# Patient Record
Sex: FEMALE | Race: WHITE
[De-identification: names, ages, dates, MRNs, and addresses within clinical notes are randomized per-mention and may not be internally consistent; named-entity substitution may affect disease eponyms.]

---

## 2017-03-14 ENCOUNTER — HOSPITAL ENCOUNTER (OUTPATIENT)
Dept: HOSPITAL 80 - FIMAGING | Age: 52
End: 2017-03-14
Attending: GENERAL ACUTE CARE HOSPITAL
Payer: COMMERCIAL

## 2017-03-14 DIAGNOSIS — Z12.31: Primary | ICD-10-CM

## 2017-03-14 PROCEDURE — G0202 SCR MAMMO BI INCL CAD: HCPCS

## 2018-02-08 ENCOUNTER — HOSPITAL ENCOUNTER (EMERGENCY)
Dept: HOSPITAL 80 - FED | Age: 53
Discharge: HOME | End: 2018-02-08
Payer: COMMERCIAL

## 2018-02-08 VITALS — RESPIRATION RATE: 16 BRPM | TEMPERATURE: 97.9 F

## 2018-02-08 VITALS — DIASTOLIC BLOOD PRESSURE: 73 MMHG | SYSTOLIC BLOOD PRESSURE: 115 MMHG | OXYGEN SATURATION: 96 % | HEART RATE: 61 BPM
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
## 2018-02-08 DIAGNOSIS — S22.32XA: Primary | ICD-10-CM

## 2018-02-08 NOTE — EDPHY
H & P


Stated Complaint: Fell while running this morning, injury to left ribs.


Time Seen by Provider: 02/08/18 08:44


HPI/ROS: 





CHIEF COMPLAINT: Rib pain





HISTORY OF PRESENT ILLNESS: The patient is a 53 y/o female arriving with her 

daughter complaining of left lateral chest pain secondary to a fall while 

running this morning. She tripped while running and landed on her left chest 

and left knee. She has localized pain over her left lower lateral ribs. She 

describes popping sensation when breathing and says her pain is worse with 

movement. The knee pain is minimal and has not caused difficulty walking. She 

denies striking her head, loss of consciousness, weakness, paresthesias, dyspnea

, or other complaints. She is normally healthy.





REVIEW OF SYSTEMS:


Constitutional:  No weakness


Eyes:  No visual changes or eye pain


ENT:  No dental trauma


Neck:No pain or injury


Respiratory:  No shortness of breath


Gastrointestinal:  No abdominal pain, no vomiting


Back:No pain or injury


Genitourinary:  No hematuria


Skin:  No lacerations


Neurological:  No headache, no dizziness











- Personal History


Current Tetanus Diphtheria and Acellular Pertussis (TDAP): Yes





- Medical/Surgical History


PMH: 





Denies


Hx Asthma: No


Hx Chronic Respiratory Disease: No


Hx Diabetes: No


Hx Cardiac Disease: No


Hx Renal Disease: No


Hx Cirrhosis: No


Hx Alcoholism: No


Hx HIV/AIDS: No


Hx Splenectomy or Spleen Trauma: No


Other PMH: Denies.





- Social History


Smoking Status: Never smoked


Additional Social History: 





Nonsmoker. Daughter at bedside. Lives in [place].





- Physical Exam


Exam: 





General Appearance:  Alert, no distress


Head:  Atraumatic


Eyes:  No conjunctival erythema, PERRLA, EOMI


ENT, Mouth:  no oral trauma, no bony tenderness


Neck:  Nontender, full range of motion without pain


Respiratory:  Tender over the left lateral chest wall, with a popping sensation 

with inspiration, lungs are clear to auscultation


Cardiovascular:  Regular rate and rhythm


Abdomen:  Abdomen is soft and nontender


Skin:  No laceration


Back:  No midline T/L/S tenderness


Extremities:  Left knee-abrasion anteriorly, range of motion without pain, no 

joint effusion


Neurological:  A&Ox3, normal motor function, normal sensory exam, cranial 

nerves intact


Psychiatric:  Mood and affect normal





Constitutional: 


 Initial Vital Signs











Temperature (C)  36.6 C   02/08/18 08:07


 


Heart Rate  80   02/08/18 08:07


 


Respiratory Rate  16   02/08/18 08:07


 


Blood Pressure  99/72 L  02/08/18 08:07


 


O2 Sat (%)  97   02/08/18 08:07








 











O2 Delivery Mode               Room Air














Allergies/Adverse Reactions: 


 





No Known Allergies Allergy (Unverified 10/11/11 13:21)


 








Home Medications: 














 Medication  Instructions  Recorded


 


Zoloft 100mg (*)  02/08/18














Medical Decision Making





- Diagnostics


Imaging Results: 


Chest x-ray discussed with the radiologist reveals a pulmonary nodule, and no 

pneumothorax.


Imaging: Discussed imaging studies w/ On call Radiologist, I viewed and 

interpreted images myself


ED Course/Re-evaluation: 


This is a healthy 53 y/o female who presents with point tenderness and mild 

crepitus over her left lateral chest wall secondary to a trip and fall while 

running this morning. Normal respiratory exam. Exam is consistent with rib 

fracture. Plan to treat with standard fracture care including ice, NSAIDs, 

lidocaine patches, incentive spirometer. Patient declined narcotic pain 

medication. Recommended follow up with PCP as needed.





Chest x-ray shows no obvious fracture or pneumothorax. She does have a left 

upper lung nodule that will require a repeat chest x-ray in 3 months. This was 

communicated to the patient. 


Differential Diagnosis: 





Differential diagnosis includes though it is not limited to fracture, 

intracranial hemorrhage, pneumothorax, hemothorax, intra-abdominal hemorrhage.





- Data Points


Medications Given: 


 








Discontinued Medications





Miscellaneous Medication (Icy Hot Lidocaine/Menthol 4%/1% Patch)  1 patch TD 

EDNOW ONE


   Stop: 02/08/18 09:18


   Last Admin: 02/08/18 09:18 Dose:  1 patch








Departure





- Departure


Disposition: Home, Routine, Self-Care


Clinical Impression: 


 Left rib fracture





Condition: Good


Instructions:  Lidocaine Patch (On the skin), How to Use an Incentive 

Spirometer (ED), Rib Fracture (ED)


Additional Instructions: 


1. Apply ice to sore areas. Expect to feel more sore over the next 1-2 days. I 

recommend decreasing activity during this time to allow your body a chance to 

heal.


2. Use Tylenol and ibuprofen as needed for pain over the next several days. 


3. Apply lidocaine patches as directed on the packaging as needed for pain. 

These are available over-the-counter.


4. Use incentive spirometer as directed to promote full inspirations.


5. Follow up with your primary care provider for unimproved symptoms over the 1-

2 weeks.


6. Return to the ED for severe pain, difficulty breathing, or other worsening 

of condition.


7. Get a repeat chest x-ray in 3 months through your primary care provider to 

follow up on left lung nodule.





Adult Pain & Fever Control:


We recommend Acetaminophen (Tylenol) and Ibuprofen (Motrin,Advil) for pain and 

fever control.  When fever is high or pain severe, both drugs can be used at 

the same time, but at different intervals.  Please note the time differences.  


Your dose is:     Acetaminophen 650mg every 4 to 6 hours


        Ibuprofen 600mg every 6-8 hours with food 


Note:  do not take Acetaminophen with Hydrocodone (Vicodin, Lortab) or 

Oxycodone (Percocet).  These medications also contain Acetaminophen.  No more 

than 3000mg of Acetaminophen should be taken in 24 hours (for an adult).


Referrals: 


Lilian Roy MD [Primary Care Provider] - As per Instructions


Report Scribed for: Yessi Fajardo


Report Scribed by: Mima Perez


Date of Report: 02/08/18


Time of Report: 08:48


Physician Review and Approval Statement: 





02/08/18 08:48


Portions of this note were transcribed by a medical scribe.  I personally 

performed a history, physical exam, medical decision making, and confirmed 

accuracy of information the transcribed note.

## 2018-05-09 ENCOUNTER — HOSPITAL ENCOUNTER (OUTPATIENT)
Dept: HOSPITAL 80 - FIMAGING | Age: 53
End: 2018-05-09
Attending: OBSTETRICS & GYNECOLOGY
Payer: COMMERCIAL

## 2018-05-09 DIAGNOSIS — R91.8: ICD-10-CM

## 2018-05-09 DIAGNOSIS — Z12.31: Primary | ICD-10-CM

## 2018-06-05 ENCOUNTER — HOSPITAL ENCOUNTER (OUTPATIENT)
Dept: HOSPITAL 80 - FIMAGING | Age: 53
End: 2018-06-05
Attending: FAMILY MEDICINE
Payer: COMMERCIAL

## 2018-06-05 DIAGNOSIS — R91.8: ICD-10-CM

## 2018-06-05 DIAGNOSIS — R92.8: Primary | ICD-10-CM
